# Patient Record
Sex: MALE
[De-identification: names, ages, dates, MRNs, and addresses within clinical notes are randomized per-mention and may not be internally consistent; named-entity substitution may affect disease eponyms.]

---

## 2018-12-17 LAB
ALBUMIN SERPL-MCNC: 4.2 G/DL (ref 3.5–5)
ALBUMIN/GLOB SERPL: 1.2 {RATIO} (ref 0.8–2)
ALP SERPL-CCNC: 107 IU/L (ref 40–150)
ALT SERPL-CCNC: 56 IU/L (ref 0–55)
ANION GAP SERPL CALC-SCNC: 13.7 MMOL/L (ref 8–16)
BASOPHILS # BLD AUTO: 0.1 10*3/UL (ref 0–0.1)
BASOPHILS NFR BLD AUTO: 0.7 % (ref 0–1)
BUN SERPL-MCNC: 11 MG/DL (ref 7–26)
BUN/CREAT SERPL: 14 (ref 6–25)
CALCIUM SERPL-MCNC: 9.7 MG/DL (ref 8.4–10.2)
CHLORIDE SERPL-SCNC: 102 MMOL/L (ref 98–107)
CO2 SERPL-SCNC: 28 MMOL/L (ref 22–29)
DEPRECATED INR PLAS: 0.91
DEPRECATED NEUTROPHILS # BLD AUTO: 4.3 10*3/UL (ref 2.1–6.9)
EGFRCR SERPLBLD CKD-EPI 2021: > 60 ML/MIN (ref 60–?)
EOSINOPHIL # BLD AUTO: 0.3 10*3/UL (ref 0–0.4)
EOSINOPHIL NFR BLD AUTO: 3.8 % (ref 0–6)
ERYTHROCYTE [DISTWIDTH] IN CORD BLOOD: 12 % (ref 11.7–14.4)
GLOBULIN PLAS-MCNC: 3.5 G/DL (ref 2.3–3.5)
GLUCOSE SERPLBLD-MCNC: 94 MG/DL (ref 74–118)
HCT VFR BLD AUTO: 43.5 % (ref 38.2–49.6)
HGB BLD-MCNC: 15.4 G/DL (ref 14–18)
LYMPHOCYTES # BLD: 2.1 10*3/UL (ref 1–3.2)
LYMPHOCYTES NFR BLD AUTO: 28.7 % (ref 18–39.1)
MCH RBC QN AUTO: 30.3 PG (ref 28–32)
MCHC RBC AUTO-ENTMCNC: 35.4 G/DL (ref 31–35)
MCV RBC AUTO: 85.6 FL (ref 81–99)
MONOCYTES # BLD AUTO: 0.6 10*3/UL (ref 0.2–0.8)
MONOCYTES NFR BLD AUTO: 8.6 % (ref 4.4–11.3)
NEUTS SEG NFR BLD AUTO: 57.8 % (ref 38.7–80)
PLATELET # BLD AUTO: 219 X10E3/UL (ref 140–360)
POTASSIUM SERPL-SCNC: 3.7 MMOL/L (ref 3.5–5.1)
PROTHROMBIN TIME: 13.1 SECONDS (ref 11.9–14.5)
RBC # BLD AUTO: 5.08 X10E6/UL (ref 4.3–5.7)
SODIUM SERPL-SCNC: 140 MMOL/L (ref 136–145)

## 2018-12-18 ENCOUNTER — HOSPITAL ENCOUNTER (OUTPATIENT)
Dept: HOSPITAL 88 - CATH LAB | Age: 48
Discharge: HOME | End: 2018-12-18
Attending: INTERNAL MEDICINE
Payer: COMMERCIAL

## 2018-12-18 VITALS — DIASTOLIC BLOOD PRESSURE: 72 MMHG | SYSTOLIC BLOOD PRESSURE: 119 MMHG

## 2018-12-18 VITALS — SYSTOLIC BLOOD PRESSURE: 120 MMHG | DIASTOLIC BLOOD PRESSURE: 76 MMHG

## 2018-12-18 VITALS — DIASTOLIC BLOOD PRESSURE: 87 MMHG | SYSTOLIC BLOOD PRESSURE: 123 MMHG

## 2018-12-18 VITALS — DIASTOLIC BLOOD PRESSURE: 70 MMHG | SYSTOLIC BLOOD PRESSURE: 118 MMHG

## 2018-12-18 VITALS — BODY MASS INDEX: 32.47 KG/M2 | HEIGHT: 66 IN | WEIGHT: 202 LBS

## 2018-12-18 VITALS — SYSTOLIC BLOOD PRESSURE: 121 MMHG | DIASTOLIC BLOOD PRESSURE: 70 MMHG

## 2018-12-18 VITALS — DIASTOLIC BLOOD PRESSURE: 77 MMHG | SYSTOLIC BLOOD PRESSURE: 133 MMHG

## 2018-12-18 VITALS — DIASTOLIC BLOOD PRESSURE: 74 MMHG | SYSTOLIC BLOOD PRESSURE: 127 MMHG

## 2018-12-18 VITALS — SYSTOLIC BLOOD PRESSURE: 127 MMHG | DIASTOLIC BLOOD PRESSURE: 74 MMHG

## 2018-12-18 DIAGNOSIS — Z01.810: ICD-10-CM

## 2018-12-18 DIAGNOSIS — I25.118: Primary | ICD-10-CM

## 2018-12-18 DIAGNOSIS — I10: ICD-10-CM

## 2018-12-18 DIAGNOSIS — Z01.812: ICD-10-CM

## 2018-12-18 DIAGNOSIS — E78.5: ICD-10-CM

## 2018-12-18 PROCEDURE — 36415 COLL VENOUS BLD VENIPUNCTURE: CPT

## 2018-12-18 PROCEDURE — 93458 L HRT ARTERY/VENTRICLE ANGIO: CPT

## 2018-12-18 PROCEDURE — 85025 COMPLETE CBC W/AUTO DIFF WBC: CPT

## 2018-12-18 PROCEDURE — 80053 COMPREHEN METABOLIC PANEL: CPT

## 2018-12-18 PROCEDURE — 85610 PROTHROMBIN TIME: CPT

## 2018-12-18 PROCEDURE — 93005 ELECTROCARDIOGRAM TRACING: CPT

## 2018-12-18 NOTE — NUR
1135am

-3c TR band ,no oozing ,sats 97% ,positive radial pulse,(Positve 7cc)

1150am

-3cc Tr Band , no oozing ,sats 97%,positive radial pulse,(Positive 4cc)

1205

-3cc TR Band ,no oozing ,sats 97%,positive radial pulse,(Positive 1cc)

1220pm

TR band titration completed no oozing, dressing applied sterile 2x2 and coban,Family at bs 
and papers signed for DC.

## 2018-12-18 NOTE — NUR
1500 Iv removed site w/o s/s infiltration. Pt tolerating po intake. Assist to private car 
with [employer] employee escort. Has dc paper aware of importance of f/o up care. TR band site w/o 
s/s oozing radial pulse adequate. Dressing dry and intact on arm board requested to keep 
splint on till tomorrow with shower

## 2018-12-18 NOTE — NUR
1130am Received pt into cath lab recovery Rm #4 [place] [facility] RM Identifierx2. Received report 
form Linh CHRISTIANSON. [facility] DR Joshua via Rt TR band approach. To f/o 2wks.Back to baseline 
orientation. VALENTÍN Resp shallow and regular. 97% on room air. Denies CP or SOB. Abdomen soft 
and non tender denies necessity to defecate or urinate.Bilateral femoral pulses present. Off 
po intake tolerates well.Rt TR band site w/o s/s bleeding intact. Titration starts at 1130 
per Dr Joshua with 11cc in balloon.Will be dc today.Family reviewed dc planning and has 
copies of POC.Knows importance to f/o in two weeks.

## 2019-02-06 NOTE — OPERATIVE REPORT
DATE OF PROCEDURE:  December 18, 2018



CARDIAC CATH LAB PROCEDURE



INDICATION:  Coronary artery disease.



PROCEDURES PERFORMED:

1. Left heart catheterization, selective coronary angiography, left 

ventriculography.

2. Deployment of right wrist transradial band.



COMPLICATIONS:  None.



RECOMMENDATIONS:  Medical therapy.



DESCRIPTION OF PROCEDURE:  Access obtained in the right radial artery.  A 

5-[place] sheath was placed.  Diagnostic coronary angiogram revealed mild 

coronary artery disease, approximately 20% luminal stenosis diffusely in 

the circumflex and right coronary arteries.  Left anterior descending 

artery had proximal 50% stenosis.  LV ejection fraction 65%.  LV 

end-diastolic pressure 12.  No gradient across the aortic valve on 

pullback.  Right wrist TR band was applied.  Guide and sheath removed.  

Patient discharged home same day.





DD:  02/06/2019 17:56

DT:  02/06/2019 23:03

Job#:  [id number]